# Patient Record
Sex: MALE | Race: WHITE
[De-identification: names, ages, dates, MRNs, and addresses within clinical notes are randomized per-mention and may not be internally consistent; named-entity substitution may affect disease eponyms.]

---

## 2017-08-04 ENCOUNTER — HOSPITAL ENCOUNTER (OUTPATIENT)
Dept: HOSPITAL 47 - RADCTMAIN | Age: 72
Discharge: HOME | End: 2017-08-04
Payer: MEDICARE

## 2017-08-04 DIAGNOSIS — I70.0: ICD-10-CM

## 2017-08-04 DIAGNOSIS — J43.9: Primary | ICD-10-CM

## 2017-08-04 LAB
BUN SERPL-SCNC: 24 MG/DL (ref 9–20)
NON-AFRICAN AMERICAN GFR(MDRD): >60

## 2017-08-04 PROCEDURE — 84520 ASSAY OF UREA NITROGEN: CPT

## 2017-08-04 PROCEDURE — 82565 ASSAY OF CREATININE: CPT

## 2017-08-04 PROCEDURE — 36415 COLL VENOUS BLD VENIPUNCTURE: CPT

## 2017-08-04 PROCEDURE — 71260 CT THORAX DX C+: CPT

## 2017-08-04 NOTE — CT
EXAMINATION TYPE: CT chest w con

 

DATE OF EXAM: 8/4/2017

 

COMPARISON: NONE

 

HISTORY: cough for 5 months

 

CT DLP: 807 mGycm

Automated exposure control for dose reduction was used.

 

CONTRAST: 

CT scan of the chest is performed with IV Contrast, patient injected with 100 mL of Omnipaque 300.

 

FINDINGS:

 

There is diffuse pulmonary emphysema. There is no evidence of a pulmonary mass. There is no pleural e
ffusion. Heart size is normal. There is no pericardial effusion. There is no evidence of aortic aneur
ysm or dissection. There is mild atheromatous change in the thoracic aorta. There are multiple medias
tinal and bronchial lymph nodes that measure up to 1.7 cm. There is spurring in the thoracic spine. I
 see no bony destructive process.

IMPRESSION:  Atheromatous aorta. There are a few mediastinal and bronchial lymph nodes of uncertain s
ignificance. Pulmonary emphysema. 3 cm right renal cortical cyst is noted. No sign of adenopathy in t
he upper abdomen.

## 2019-02-25 ENCOUNTER — HOSPITAL ENCOUNTER (OUTPATIENT)
Dept: HOSPITAL 47 - RADCTMAIN | Age: 74
Discharge: HOME | End: 2019-02-25
Payer: MEDICARE

## 2019-02-25 DIAGNOSIS — R91.8: Primary | ICD-10-CM

## 2019-02-25 PROCEDURE — 71250 CT THORAX DX C-: CPT

## 2019-02-25 NOTE — CT
EXAMINATION TYPE: CT chest wo con

 

DATE OF EXAM: 2/25/2019

 

COMPARISON: Prior chest CT August 4, 2017

 

HISTORY: chest mass, abnormal left lung x-ray per order.

 

CT DLP: 557.5 mGycm.  Automated Exposure Control for Dose Reduction was Utilized.

 

 

TECHNIQUE:  CT scan of the thorax is performed without IV contrast.

 

FINDINGS:

 

LUNGS: Background fairly moderate underlying emphysematous change most prominent in the upper lungs i
s redemonstrated. There are multiple new or irregular spiculated masses for reference left hilar lesi
on measuring 3.4 x 3.3 cm is identified axial image 29 appears to be invading the mediastinal fat. Th
ere is irregular soft tissue extension anteriorly and inferiorly where there is larger pleural-based 
mass invading anterior chest wall deep fat measuring 3.2 x 3.1 cm axial image 28. There is additional
 2.3 x 1.2 cm pleural oval lesion immediately deep to left anterolateral rib axial image 36. Addition
al pleural-based masses anteriorly laterally and superior to this are identified for reference 2.7 x 
1.4 cm lesion axial image 22 is noted. Some scattered left mid to lower lung lateral lung nodularity 
is seen for reference is 11 x 6 mm lesion axial image 37. Stable right lower lobe 3 mm nodule axial i
mage 38. No new right-sided nodules. No pleural effusion or pneumothorax is evident bilaterally.

 

MEDIASTINUM: Lack of IV contrast is noted to limit evaluation for mediastinal and especially hilar ad
enopathy. There is confluent prevascular adenopathy measuring 4.8 x 2.9 cm on axial image 24. There i
s new left hilar adenopathy for reference 2.3 x 1.5 cm lesion anterior to descending aorta axial imag
e 30.   No cardiomegaly or pericardial effusion is seen. Moderate to severe 3 vessel coronary artery 
calcification is present which is noted marker for coronary artery disease. Mild to moderate calcifie
d plaque of aorta extends into branch vessels.

 

OTHER: New suspicious right adrenal nodular thickening or mass measuring 19 x 10 mm axial image 64. S
table oval hypodense lesion posterior right kidney axial image 75 upper to mid pole level favoring si
mple cyst. Multilevel moderate anterior spurring mid to lower thoracic spine noted. New suspicious bu
t nonspecific 7 x 6 mm anterior right abdominal subcutaneous soft tissue nodule axial image 73

 

IMPRESSION: Suspicious left lung multifocal predominantly peripheral pleural-based nodules all new fr
om prior CT suggesting interval development high-grade malignancy as there is suspected new right adr
enal involvement and chest wall as well as mediastinal invasion. Consider PET/CT to further evaluate 
and stage.. Consider imaging guided biopsy to further evaluate as pleural-based peripheral lesions ca
n be targeted.

## 2019-03-16 ENCOUNTER — HOSPITAL ENCOUNTER (OUTPATIENT)
Dept: HOSPITAL 47 - RADPETMAIN | Age: 74
Discharge: HOME | End: 2019-03-16
Attending: INTERNAL MEDICINE
Payer: MEDICARE

## 2019-03-16 DIAGNOSIS — R91.8: Primary | ICD-10-CM

## 2019-03-16 DIAGNOSIS — R16.0: ICD-10-CM

## 2019-03-16 PROCEDURE — 78815 PET IMAGE W/CT SKULL-THIGH: CPT

## 2019-03-19 NOTE — PE
Nuclear medicine PET/CT

 

HISTORY: Lung mass, solitary pulmonary nodule, initial

 

Patient received 13 mCi F-18 FDG intravenously in delayed scanning was performed from the skull base 
to the mid thighs. Localization and attenuation correction CT scan was performed.

 

Correlation CT chest 2/25/2019

 

There is a soft tissue mass present within the musculature of the proximal left upper extremity which
 measures approximately 13 mm in short axis and shows associated hypermetabolic uptake, SUV 8.5, axia
l image 42.

 

Neck and chest: Subpectoral node shows associated hypermetabolic uptake on the left, SUV 6.2. There i
s an internal mammary node on the left, axial image 69 with associated hypermetabolic uptake, SUV 4.1
. Pleural based mass in the left upper lobe anteriorly shows some associated rib destruction, local i
nternal mammary adenopathy and associated hypermetabolic uptake, SUV 6.8-9.6, there is extension from
 the anterior pleura along the mediastinum into the left hilar region, aorticopulmonary window mass i
s present with associated hypermetabolic uptake with SUV 17.5 measuring approximately 6.5 x 5.5 cm. T
here is right hilar adenopathy with SUV 11.4, subcarinal adenopathy with SUV 15, associated hypermeta
bolic uptake as well as left hilar adenopathy with narrowing of the left upper lobe bronchus. Additio
nal anterior pleural soft tissue mass is present with associated hypermetabolic, SUV 12.3, uptake jacinta
ng the level of the lingula. Subcutaneous fat just inferior to the scapula shows a measurement of lizette
roximately 2.2 cm and associated hypermetabolic uptake SUV 12.1, additional nodules present immediate
ly adjacent with similar characteristics.  Coronary artery calcifications are present. Extensive emph
ysematous changes are present within the lungs.

 

Abdomen pelvis: There are at least 3 liver masses with associated hypermetabolic uptake present, SUV 
4-5.5 the masses are not well seen on noncontrast exam, largest thought to be within the inferior rig
ht lobe. Right adrenal gland shows some enlargement, low density, no definite associated hypermetabol
ic uptake. No retroperitoneal adenopathy. Within the retroperitoneal fat posterior to the right kidne
y however there is a soft tissue mass measuring 2 cm with associated hypermetabolic uptake, SUV 8.3. 
Within the presacral region there is a soft tissue mass measuring 2 cm with associated hypermetabolic
 uptake, SUV 16.8. Additional soft tissue mass present along the left and right iliac regions show as
sociated hypermetabolic uptake, SUV 17.3 on the left and 3.7 on the right, there is a soft tissue mas
s within the musculature of the right hip with associated hypermetabolic uptake, SUV 6.7. Small subcu
taneous nodule within the fat posterior to the left kidney shows associated hypermetabolic uptake, LOVE
V 3.1.

 

Osseous structures show focus of hypermetabolic uptake within the left ilium anteriorly, SUV 5.2.

 

IMPRESSION: Metastatic disease as described.

## 2019-03-28 ENCOUNTER — HOSPITAL ENCOUNTER (OUTPATIENT)
Dept: HOSPITAL 47 - RADCTMAIN | Age: 74
Discharge: HOME | End: 2019-03-28
Attending: INTERNAL MEDICINE
Payer: MEDICARE

## 2019-03-28 DIAGNOSIS — R91.8: ICD-10-CM

## 2019-03-28 DIAGNOSIS — C79.31: Primary | ICD-10-CM

## 2019-03-28 LAB — BUN SERPL-SCNC: 28 MG/DL (ref 9–20)

## 2019-03-28 PROCEDURE — 82565 ASSAY OF CREATININE: CPT

## 2019-03-28 PROCEDURE — 36415 COLL VENOUS BLD VENIPUNCTURE: CPT

## 2019-03-28 PROCEDURE — 70470 CT HEAD/BRAIN W/O & W/DYE: CPT

## 2019-03-28 PROCEDURE — 84520 ASSAY OF UREA NITROGEN: CPT

## 2019-03-28 NOTE — CT
EXAMINATION TYPE: CT brain wo/w con

 

DATE OF EXAM: 3/28/2019

 

COMPARISON: None

 

HISTORY: Pulmonary Nodules

 

CT DLP: 2256mGycm

 

CONTRAST: 

CT scan of the head is performed without and with IV Contrast, patient injected with 80 mL of Isovue 
300.

 

Unenhanced followed by contrast enhanced CT of the brain is submitted for evaluation.

 

There are multiple enhancing lesions compatible with metastatic disease. Right cerebellar lesion guillermo
ures 1 cm, right frontal lesion ring-enhancing measures 9.6 mm, 8.5 mm left caudate nucleus lesion, 9
.2 mm right thalamic lesion, 7.6 mm left thalamic lesion, right occipital 2.0 cm ring-enhancing lesio
n, 9.6 mm right frontal ring-enhancing lesion and 7 mm left occipital lesion. 

 

There is mild surrounding vasogenic edema. No evidence for midline shift or herniation at this time. 
No intracranial hemorrhage. Ventricles are normal size for the patient's age group.

 

IMPRESSION:

1. Metastatic lesions to the supra and infratentorial brain.